# Patient Record
Sex: FEMALE | Race: WHITE
[De-identification: names, ages, dates, MRNs, and addresses within clinical notes are randomized per-mention and may not be internally consistent; named-entity substitution may affect disease eponyms.]

---

## 2018-08-22 ENCOUNTER — HOSPITAL ENCOUNTER (INPATIENT)
Dept: HOSPITAL 92 - L&D/OP | Age: 33
LOS: 7 days | Discharge: HOME | End: 2018-08-29
Attending: OBSTETRICS & GYNECOLOGY | Admitting: OBSTETRICS & GYNECOLOGY
Payer: COMMERCIAL

## 2018-08-22 VITALS — BODY MASS INDEX: 23.3 KG/M2

## 2018-08-22 DIAGNOSIS — Z88.1: ICD-10-CM

## 2018-08-22 DIAGNOSIS — Z79.899: ICD-10-CM

## 2018-08-22 DIAGNOSIS — E03.9: ICD-10-CM

## 2018-08-22 DIAGNOSIS — Z88.5: ICD-10-CM

## 2018-08-22 DIAGNOSIS — Z88.8: ICD-10-CM

## 2018-08-22 DIAGNOSIS — Z3A.35: ICD-10-CM

## 2018-08-22 LAB
ALBUMIN SERPL BCG-MCNC: 4.6 G/DL (ref 3.5–5)
ALP SERPL-CCNC: 61 U/L (ref 40–150)
ALT SERPL W P-5'-P-CCNC: 13 U/L (ref 8–55)
ANION GAP SERPL CALC-SCNC: 13 MMOL/L (ref 10–20)
AST SERPL-CCNC: 15 U/L (ref 5–34)
BILIRUB SERPL-MCNC: 0.5 MG/DL (ref 0.2–1.2)
BUN SERPL-MCNC: 15 MG/DL (ref 7–18.7)
CALCIUM SERPL-MCNC: 9.4 MG/DL (ref 7.8–10.44)
CHLORIDE SERPL-SCNC: 105 MMOL/L (ref 98–107)
CO2 SERPL-SCNC: 24 MMOL/L (ref 22–29)
CREAT CL PREDICTED SERPL C-G-VRATE: 108 ML/MIN (ref 70–130)
GLOBULIN SER CALC-MCNC: 2.5 G/DL (ref 2.4–3.5)
GLUCOSE SERPL-MCNC: 96 MG/DL (ref 70–105)
HBSAG INDEX: 0.21 S/CO (ref 0–0.99)
HGB BLD-MCNC: 11.9 G/DL (ref 12–16)
MCH RBC QN AUTO: 32.7 PG (ref 27–31)
MCV RBC AUTO: 89.8 FL (ref 78–98)
PLATELET # BLD AUTO: 168 THOU/UL (ref 130–400)
POTASSIUM SERPL-SCNC: 3.9 MMOL/L (ref 3.5–5.1)
RBC # BLD AUTO: 3.63 MILL/UL (ref 4.2–5.4)
SODIUM SERPL-SCNC: 138 MMOL/L (ref 136–145)
SYPHILIS ANTIBODY INDEX: 0.13 S/CO
WBC # BLD AUTO: 10.3 THOU/UL (ref 4.8–10.8)

## 2018-08-22 PROCEDURE — 82805 BLOOD GASES W/O2 SATURATION: CPT

## 2018-08-22 PROCEDURE — 85027 COMPLETE CBC AUTOMATED: CPT

## 2018-08-22 PROCEDURE — 86850 RBC ANTIBODY SCREEN: CPT

## 2018-08-22 PROCEDURE — 80053 COMPREHEN METABOLIC PANEL: CPT

## 2018-08-22 PROCEDURE — 86900 BLOOD TYPING SEROLOGIC ABO: CPT

## 2018-08-22 PROCEDURE — 82565 ASSAY OF CREATININE: CPT

## 2018-08-22 PROCEDURE — 86901 BLOOD TYPING SEROLOGIC RH(D): CPT

## 2018-08-22 PROCEDURE — 86780 TREPONEMA PALLIDUM: CPT

## 2018-08-22 PROCEDURE — A4216 STERILE WATER/SALINE, 10 ML: HCPCS

## 2018-08-22 PROCEDURE — 99285 EMERGENCY DEPT VISIT HI MDM: CPT

## 2018-08-22 PROCEDURE — 36415 COLL VENOUS BLD VENIPUNCTURE: CPT

## 2018-08-22 PROCEDURE — 87086 URINE CULTURE/COLONY COUNT: CPT

## 2018-08-22 PROCEDURE — 51702 INSERT TEMP BLADDER CATH: CPT

## 2018-08-22 PROCEDURE — 83735 ASSAY OF MAGNESIUM: CPT

## 2018-08-22 PROCEDURE — 84156 ASSAY OF PROTEIN URINE: CPT

## 2018-08-22 PROCEDURE — 87340 HEPATITIS B SURFACE AG IA: CPT

## 2018-08-22 PROCEDURE — 84155 ASSAY OF PROTEIN SERUM: CPT

## 2018-08-22 PROCEDURE — 85025 COMPLETE CBC W/AUTO DIFF WBC: CPT

## 2018-08-22 PROCEDURE — 82570 ASSAY OF URINE CREATININE: CPT

## 2018-08-22 PROCEDURE — 88307 TISSUE EXAM BY PATHOLOGIST: CPT

## 2018-08-22 RX ADMIN — Medication SCH MLS: at 20:04

## 2018-08-22 RX ADMIN — Medication SCH MLS: at 23:35

## 2018-08-22 RX ADMIN — Medication SCH MLS: at 14:45

## 2018-08-22 RX ADMIN — MAGNESIUM SULFATE HEPTAHYDRATE SCH MLS: 40 INJECTION, SOLUTION INTRAVENOUS at 14:45

## 2018-08-22 RX ADMIN — Medication SCH MLS: at 10:58

## 2018-08-22 NOTE — PDOC.LDHP
Labor and Delivery H&P


Chief complaint: loss of fluid


HPI: 





34yo  at 35w1d by LMP c/o ROM this am, clear fluid.  Denies sx PIH.  Occ 

abd pain, no intense ctx pains.  Good FM


Current gestational age (weeks): 35


Due date: 18


Dating criteria: last menstrual period


Grav: 1


Para: 0


Current pregnancy complications: none


Abnormal US findings: No


Past Medical History: 





CHTN prev on lisinopril, nl BP this pregnancy, h/o aplastic anemia s/p chemo 

and BMT, CHARLENE since > 10 yr ago.  hypothyroidism, on replacement, well 

controlled.


Current medications: pre-huy vitamins, other (levothyroxine)


Previous surgical history: other (bone marrow transplant, blood transfusions 

with aplastic anemia)


Allergies/Adverse Reactions: 


 Allergies











Allergy/AdvReac Type Severity Reaction Status Date / Time


 


amoxicillin [From Augmentin] Allergy Mild Hives Verified 18 21:37


 


clavulanic acid Allergy Mild itching Verified 18 21:37





[From Augmentin]     


 


promethazine [From Phenergan] Allergy Mild itching Verified 18 21:37


 


tramadol Allergy Mild itching Verified 18 21:37











Social history: none





- Physical Exam


Vital signs reviewed and normal: yes


General: NAD (nervous)


Heart: RRR


Lungs: CTAB


Abdomen: gravid


Extremeties: no edema


FHT: category 1


[place] contractions every: rare





- Vaginal Exam


cm dilated: 6


Effacement: 100%


Station: 0 (forebag palpable)





- OB Labs


RH: positive


Antibody Screen: negative


HIV: negative


RPR: negative


HEPSAg: negative


1 hour GCT: positive


3 hour GTT: normal


GBS: unknown


Urine drug screen: not done


Rubella: immune





- Assessment


L&D Assessment:  labor (/SROM, SIPIH)





- Plan


Plan: admit to L&D, GBS antibiotic prophylaxis, informed consent obtained, 

magnesium for seizure prophylaxis (labs wnl, urine pending.  s/p labetalol 20 

IV with mild range BP resulting, will cont prn labetalol.), anesthesia consult 

for pain management, other (BMZ 1/2, reinaldo consult per pt request)

## 2018-08-23 LAB
ANALYZER IN CARDIO: (no result)
BASE EXCESS STD BLDA CALC-SCNC: -4.8 MEQ/L
BASOPHILS # BLD AUTO: 0 THOU/UL (ref 0–0.2)
BASOPHILS NFR BLD AUTO: 0.1 % (ref 0–1)
EOSINOPHIL # BLD AUTO: 0.1 THOU/UL (ref 0–0.7)
EOSINOPHIL NFR BLD AUTO: 0.8 % (ref 0–10)
HCO3 BLDA-SCNC: 21.6 MEQ/L (ref 22–28)
HGB BLD-MCNC: 9.2 G/DL (ref 12–16)
LYMPHOCYTES # BLD: 1.4 THOU/UL (ref 1.2–3.4)
LYMPHOCYTES NFR BLD AUTO: 10.7 % (ref 21–51)
MCH RBC QN AUTO: 33.1 PG (ref 27–31)
MCV RBC AUTO: 90.7 FL (ref 78–98)
MONOCYTES # BLD AUTO: 1.2 THOU/UL (ref 0.11–0.59)
MONOCYTES NFR BLD AUTO: 9.5 % (ref 0–10)
NEUTROPHILS # BLD AUTO: 10 THOU/UL (ref 1.4–6.5)
NEUTROPHILS NFR BLD AUTO: 78.9 % (ref 42–75)
PLATELET # BLD AUTO: 132 THOU/UL (ref 130–400)
RBC # BLD AUTO: 2.77 MILL/UL (ref 4.2–5.4)
WBC # BLD AUTO: 12.6 THOU/UL (ref 4.8–10.8)

## 2018-08-23 PROCEDURE — 10D17Z9 MANUAL EXTRACTION OF PRODUCTS OF CONCEPTION, RETAINED, VIA NATURAL OR ARTIFICIAL OPENING: ICD-10-PCS | Performed by: OBSTETRICS & GYNECOLOGY

## 2018-08-23 RX ADMIN — Medication SCH MLS: at 04:46

## 2018-08-23 RX ADMIN — Medication PRN MLS: at 20:48

## 2018-08-23 RX ADMIN — BUPIVACAINE HYDROCHLORIDE SCH MLS: 5 INJECTION, SOLUTION EPIDURAL; INTRACAUDAL at 09:26

## 2018-08-23 RX ADMIN — MAGNESIUM SULFATE HEPTAHYDRATE SCH MLS: 40 INJECTION, SOLUTION INTRAVENOUS at 10:05

## 2018-08-23 RX ADMIN — Medication PRN MLS: at 21:54

## 2018-08-23 RX ADMIN — Medication SCH MLS: at 14:55

## 2018-08-23 RX ADMIN — Medication SCH MLS: at 10:00

## 2018-08-23 RX ADMIN — Medication SCH MLS: at 18:18

## 2018-08-23 RX ADMIN — BUPIVACAINE HYDROCHLORIDE SCH MLS: 5 INJECTION, SOLUTION EPIDURAL; INTRACAUDAL at 17:30

## 2018-08-23 RX ADMIN — MAGNESIUM SULFATE HEPTAHYDRATE SCH MLS: 40 INJECTION, SOLUTION INTRAVENOUS at 00:30

## 2018-08-23 NOTE — PDOC.OPDEL
OB Operative/Delivery Note


Delivery Dr/Surgeon: Kolby


Assist: n/a


Pre-Delivery Diagnosis: ruptured membrane (superimposed PIH at 35wk)


Procedure/Post Delivery Dx: spontaneous vaginal delivery (cord avulsion, PPH)


Weeks gestation: 35


Anesthesia: epidural





- Findings


  ** A


Sex: female


Weight: 4 lb 10 oz


Apgar - 1 min: 3


Apgar - 5 min: 7 (9 at 10 min)





- Additional Findings/Plan


Placenta delivered: manual removal (following cord avulsion, prolonged 3rd stage

, placenta would not separate intact, delivered in piecemeal fashion manually.  

Brisk bleeding following placenta, pitocin infusion and uterotonics called for, 

christopher catheter replaced, manual uterine exploration performed mutiple times 

until no remaining fragments of placenta were found.  Hemabate and cytotec were 

given.  Uterus contracted well and appropriate lochia was present.  EBL 2L, 

final qbl pending.)


Repaired Obstetrical Laceration: 1st degree (hemostatic and unrepaired.)


Estimated blood loss: 1741cc


Post delivery plan: recovery in LICU

## 2018-08-23 NOTE — PDOC.LDPN
Labor & Delivery Progress Note





- Subjective


Subjective: comfortable





- Objective


Vital signs reviewed and normal: yes


General: NAD


Uterine fundus: non tender


Dilation: 7


Effacement: 90%


Station: 0


FHT: category 1


[place] contractions every: 5min


Plan: pitocin for augmentation


-: 





(late entry from 1200 today) Pt very obtunded on exam, DTR decreased 1+ 

throughout, stop Mag and check stat Mag level, no sx PIH.  s/p epidural.  FHT 

reassuring.  Cont pitocin induction and GBS ppx.

## 2018-08-23 NOTE — PDOC.LDPN
Labor & Delivery Progress Note





- Subjective


Subjective: comfortable, loss of fluid





- Objective


Vital signs reviewed and normal: yes


General: NAD


Uterine fundus: non tender


Dilation: 6


Effacement: 100%


Station: 0


AROM: clear fluid (forebag aromed)


Plan: pitocin for augmentation, other (on PCN for GBS ppx, on Mag for sx ppx, 

no signs of PIH or mag toxicity, s/p labetalol this am for severe BP, labs wnl, 

PCR 1.5.  Proceed with pitocin for augmentation.)

## 2018-08-24 LAB
CREAT CL PREDICTED SERPL C-G-VRATE: 97 ML/MIN (ref 70–130)
HGB BLD-MCNC: 7.6 G/DL (ref 12–16)
MCH RBC QN AUTO: 33.2 PG (ref 27–31)
MCV RBC AUTO: 92 FL (ref 78–98)
PLATELET # BLD AUTO: 110 THOU/UL (ref 130–400)
RBC # BLD AUTO: 2.29 MILL/UL (ref 4.2–5.4)
WBC # BLD AUTO: 15.3 THOU/UL (ref 4.8–10.8)

## 2018-08-24 RX ADMIN — Medication SCH: at 00:41

## 2018-08-24 RX ADMIN — Medication SCH: at 00:28

## 2018-08-24 RX ADMIN — DOCUSATE CALCIUM SCH: 240 CAPSULE, LIQUID FILLED ORAL at 14:17

## 2018-08-24 NOTE — PDOC.PP
Post Partum Progress Note


Post Partum Day #: 1


PO intake tolerated: yes


Flatus: yes


Ambulation: no


 Vital Signs (12 hours)











  Temp Pulse Resp


 


 18 04:13  98.3 F  89  18


 


 18 00:00  98.3 F  89  18








 Weight











Weight                         128 lb

















- Physical Examination


General: NAD


Cardiovascular: RRR


Respiratory: non-labored breathing


Abdominal: no distention, appropriately TTP


Fundus firm & at: umb-2


Extremities: negative homans (B) (DTR 2+ throughout ext)


Skin: no rash


Neurological: no gross focal deficits


Psychiatric: normal affect


Result Diagrams: 


 18 05:38





 18 05:38


Additional Labs: 


 Post Partum Labs











Blood Type  A POSITIVE   18  06:24    


 


Hep Bs Antigen  Non-Reactive S/CO (NonReactive)   18  06:24    











(1)  premature rupture of membranes (PPROM) delivered, current 

hospitalization


Code(s): O42.919 - PRETRM RENAN ROM, UNSP TIME BETW RUPT AND ONST LABR, UNSP TRI

   Status: Acute   





(2) Severe preeclampsia


Code(s): O14.10 - SEVERE PRE-ECLAMPSIA, UNSPECIFIED TRIMESTER   Status: Acute   


Qualifiers: 


   Trimester: third trimester   Qualified Code(s): O14.13 - Severe pre-eclampsia

, third trimester   





- Assessment/Plan





PPD1 s/p PTSVD at 35w 2/2 SIPIH and PPROM c/b PPH


VSSAF


SIPIH- on mag x 24h no sx mag toxicity or PIH, diuresing well.  Follow mag 

levels due to supratherapetic level yesterday.  BP nl-mild, not requiring 

antihypertensives at this time.  Had some labetalol during labor.  


PPH ebl 1700, hgb 12-->7.6, no ongoing bleeding, no sx anemia.  Cont to monitor 

closely and start iron supp.


Cont christopher catheter, adv to reg diet, transfer to floor at 24h postpartum.

## 2018-08-25 LAB
HGB BLD-MCNC: 6.6 G/DL (ref 12–16)
HGB BLD-MCNC: 7.5 G/DL (ref 12–16)
MCH RBC QN AUTO: 33 PG (ref 27–31)
MCV RBC AUTO: 93.3 FL (ref 78–98)
PLATELET # BLD AUTO: 115 THOU/UL (ref 130–400)
RBC # BLD AUTO: 2.01 MILL/UL (ref 4.2–5.4)
WBC # BLD AUTO: 15.4 THOU/UL (ref 4.8–10.8)

## 2018-08-25 RX ADMIN — DOCUSATE CALCIUM SCH: 240 CAPSULE, LIQUID FILLED ORAL at 06:57

## 2018-08-25 RX ADMIN — DOCUSATE CALCIUM SCH MG: 240 CAPSULE, LIQUID FILLED ORAL at 14:19

## 2018-08-25 RX ADMIN — DOCUSATE CALCIUM SCH: 240 CAPSULE, LIQUID FILLED ORAL at 21:29

## 2018-08-25 NOTE — PDOC.PP
Post Partum Progress Note


Post Partum Day #: Doing well without complaints.


PO intake tolerated: yes


Flatus: yes


Ambulation: yes (minimal)


 Vital Signs (12 hours)











  Temp Pulse Resp BP Pulse Ox


 


 18 04:45  98.7 F  68  18  


 


 18 00:30  98.7 F  68  18  


 


 18 23:05  98.7 F  68  18  162/68 H  99








 Weight











Weight                         128 lb

















- Physical Examination


General: NAD


Respiratory: non-labored breathing


Abdominal: lochia (normal), no distention, appropriately TTP


Fundus firm & at: U-3


Extremities: negative homans (B)


Skin: no rash


Neurological: no gross focal deficits


Psychiatric: A&Ox3, normal affect


Result Diagrams: 


 18 05:48





 18 05:38


Additional Labs: 


 Post Partum Labs











Blood Type  A POSITIVE   18  06:24    


 


Hep Bs Antigen  Non-Reactive S/CO (NonReactive)   18  06:24    











(1)  premature rupture of membranes (PPROM) delivered, current 

hospitalization


Code(s): O42.919 - PRETRM RENAN ROM, UNSP TIME BETW RUPT AND ONST LABR, UNSP TRI

   Status: Acute   





(2) Severe preeclampsia


Code(s): O14.10 - SEVERE PRE-ECLAMPSIA, UNSPECIFIED TRIMESTER   Status: Acute   


Qualifiers: 


   Trimester: third trimester   Qualified Code(s): O14.13 - Severe pre-eclampsia

, third trimester   





- Assessment/Plan





Patient still having some anxiety but doing well.  Now s/p 24 hours of PP mag.  

Will continue to watch BPs today and anticipate d/c home tomorrow.

## 2018-08-26 RX ADMIN — DOCUSATE CALCIUM SCH: 240 CAPSULE, LIQUID FILLED ORAL at 08:14

## 2018-08-26 NOTE — PRG
DATE OF SERVICE:  2018.

 

SUBJECTIVE:  The patient is a 33-year-old female now postpartum day #3 status 
post a  spontaneous vaginal delivery for premature rupture of membranes 
with superimposed PIH.  The patient had approximately 2 liter of blood loss due 
to atony and retained placenta.  The patient's most recent hemoglobin was 7.5, 
hematocrit 21.5, which had been stable now for 24 hours.  The patient today or 
in the last 24 hours has been having occasional blood pressure spikes into the 
severe range with the most recent blood pressure being in the 180s over 90s.  
The patient is otherwise not having symptoms.  She is tolerating a diet, having 
decreased lochia and ambulating and voiding on her own.

 

PHYSICAL EXAMINATION:

GENERAL:  The patient appears to be in no acute distress.  She is alert and 
oriented, cooperative and pleasant to interact with.

HEENT:  Head is normocephalic, atraumatic.

ABDOMEN:  Fundus is firm.

EXTREMITIES:  Nontender with 1-2+ pitting edema bilaterally.

 

ASSESSMENT AND PLAN:  The patient is a 33-year-old female now postpartum day #3 
status post a  delivery for premature rupture of membranes and 
preeclampsia.  We have instituted the patient on oral antihypertensives, 
[id number] mg bid has been started.  In addition, the patient will be given 
10 mg of hydralazine IV now for her most current blood pressure.  Should she 
have good blood pressure control for the next 24 hours, the patient can be 
discharged tomorrow when her primary OB, Dr. Jarrett will be back on service.

 

Addendum:  blood pressure medicine decreased to labetolol 100mg bid as blood 
pressures in the 110s systolic
MTDD

## 2018-08-27 LAB
ANISOCYTOSIS BLD QL SMEAR: (no result) (100X)
HGB BLD-MCNC: 7.8 G/DL (ref 12–16)
MCH RBC QN AUTO: 33.8 PG (ref 27–31)
MCV RBC AUTO: 93.9 FL (ref 78–98)
MDIFF COMPLETE?: YES
PLATELET # BLD AUTO: 211 THOU/UL (ref 130–400)
PLATELET BLD QL SMEAR: (no result)
POLYCHROMASIA BLD QL SMEAR: (no result) (100X)
RBC # BLD AUTO: 2.31 MILL/UL (ref 4.2–5.4)
WBC # BLD AUTO: 17.5 THOU/UL (ref 4.8–10.8)

## 2018-08-27 RX ADMIN — DOCUSATE CALCIUM SCH: 240 CAPSULE, LIQUID FILLED ORAL at 00:13

## 2018-08-27 RX ADMIN — DOCUSATE CALCIUM SCH: 240 CAPSULE, LIQUID FILLED ORAL at 08:08

## 2018-08-27 RX ADMIN — DOCUSATE CALCIUM SCH: 240 CAPSULE, LIQUID FILLED ORAL at 23:41

## 2018-08-27 RX ADMIN — GENTAMICIN SULFATE SCH MLS: 40 INJECTION, SOLUTION INTRAMUSCULAR; INTRAVENOUS at 20:47

## 2018-08-27 RX ADMIN — HYDROCODONE BITARTRATE AND ACETAMINOPHEN PRN TAB: 5; 325 TABLET ORAL at 16:18

## 2018-08-27 NOTE — PDOC.PP
Post Partum Progress Note


Post Partum Day #: 5


Subjective: 





feels well no sx PIH


PO intake tolerated: yes


Flatus: yes


Ambulation: yes


 Vital Signs (12 hours)











  Temp Pulse Resp BP Pulse Ox


 


 18 04:00  98.2 F  76  18  133/78 


 


 18 23:28  98.5 F  95  18  


 


 18 21:59   82   


 


 18 20:00  98.5 F  95  18  157/86 H  97








 Weight











Weight                         128 lb

















- Physical Examination


General: NAD


Cardiovascular: RRR


Respiratory: non-labored breathing


Abdominal: no distention, appropriately TTP


Fundus firm & at: umb-2


Extremities: negative homans (B)


Skin: no rash


Neurological: no gross focal deficits


Psychiatric: normal affect


Result Diagrams: 


 18 19:07





 18 05:38


Additional Labs: 


 Post Partum Labs











Blood Type  A POSITIVE   18  06:24    


 


Hep Bs Antigen  Non-Reactive S/CO (NonReactive)   18  06:24    











(1)  premature rupture of membranes (PPROM) delivered, current 

hospitalization


Code(s): O42.919 - PRETRM RENAN ROM, UNSP TIME BETW RUPT AND ONST LABR, UNSP TRI

   Status: Acute   





(2) Severe preeclampsia


Code(s): O14.10 - SEVERE PRE-ECLAMPSIA, UNSPECIFIED TRIMESTER   Status: Acute   


Qualifiers: 


   Trimester: third trimester   Qualified Code(s): O14.13 - Severe pre-eclampsia

, third trimester   





(3) Postpartum hemorrhage


Code(s): O72.1 - OTHER IMMEDIATE POSTPARTUM HEMORRHAGE   Status: Acute   


Qualifiers: 


   Postpartum hemorrhage type: other immediate   Qualified Code(s): O72.1 - 

Other immediate postpartum hemorrhage   





- Assessment/Plan





PPD4 s/p PTSVD at 35w 2/2 PPROM and PIH c/b PPH 


VSSAF


PIH- s/p Mag, no sx PIH, started on labetalol yesterday for severe HTN, well 

controlled now, will cont on DC, BP log at home and FU 1 wk for BP check


PPH- 2/2 cord avulsion, manual extraction/fragmentation of placenta, hgb 12-->

ebl approx 1700cc-->hgb stable at 7.5, no s/sx anemia, no indication for 

transfusion at this time.


Breastpumping, infant in NICU doing well, no sx of PPD


Rh pos RImm


DC home FU 1 wk








Addendum: called by nursing at approx 1700, pt had incr cramping in pelvis and 

chills, temp was 101.2, BP severely elevated, will check CBC Ucx, start empiric 

amp/gent for endometritis, tylenol for fever and prn labetalol for elevated 

BPs.  Hold DC.

## 2018-08-28 RX ADMIN — DOCUSATE CALCIUM SCH: 240 CAPSULE, LIQUID FILLED ORAL at 09:13

## 2018-08-28 RX ADMIN — GENTAMICIN SULFATE SCH MLS: 40 INJECTION, SOLUTION INTRAMUSCULAR; INTRAVENOUS at 19:44

## 2018-08-28 RX ADMIN — HYDROCODONE BITARTRATE AND ACETAMINOPHEN PRN TAB: 5; 325 TABLET ORAL at 12:00

## 2018-08-28 RX ADMIN — HYDROCODONE BITARTRATE AND ACETAMINOPHEN PRN TAB: 5; 325 TABLET ORAL at 04:12

## 2018-08-28 RX ADMIN — DOCUSATE CALCIUM SCH: 240 CAPSULE, LIQUID FILLED ORAL at 20:47

## 2018-08-28 NOTE — PDOC.PP
Post Partum Progress Note


Post Partum Day #: 5


Subjective: 





incr abd crampiness since yesterday, not feeling well overall.  no heavy vag 

bleeding, light.  no foul odor.  


PO intake tolerated: yes


Flatus: yes


Ambulation: yes


 Vital Signs (12 hours)











  Temp Pulse Resp BP Pulse Ox


 


 18 04:25  99.4 F  80  16  134/96 H  97


 


 18 02:30  98.4 F    


 


 18 01:30  100.1 F H  84  15  130/76  98


 


 18 22:00  100.1 F H  84  15  130/76  98


 


 18 21:10   97   


 


 18 21:00  98.4 F  97  16  








 Weight











Weight                         128 lb

















- Physical Examination


General: NAD


Cardiovascular: RRR


Respiratory: non-labored breathing


Abdominal: + bowel sounds (+fundal tenderness), no distention


Fundus firm & at: umb-2


Extremities: negative homans (B)


Skin: no rash


Neurological: no gross focal deficits


Psychiatric: normal affect


Result Diagrams: 


 18 19:07





 18 05:38


Additional Labs: 


 Post Partum Labs











Blood Type  A POSITIVE   18  06:24    


 


Hep Bs Antigen  Non-Reactive S/CO (NonReactive)   18  06:24    











(1)  premature rupture of membranes (PPROM) delivered, current 

hospitalization


Code(s): O42.919 - PRETRM RENAN ROM, UNSP TIME BETW RUPT AND ONST LABR, UNSP TRI

   Status: Acute   





(2) Severe preeclampsia


Code(s): O14.10 - SEVERE PRE-ECLAMPSIA, UNSPECIFIED TRIMESTER   Status: Acute   


Qualifiers: 


   Trimester: third trimester   Qualified Code(s): O14.13 - Severe pre-eclampsia

, third trimester   





(3) Postpartum hemorrhage


Code(s): O72.1 - OTHER IMMEDIATE POSTPARTUM HEMORRHAGE   Status: Acute   


Qualifiers: 


   Postpartum hemorrhage type: other immediate   Qualified Code(s): O72.1 - 

Other immediate postpartum hemorrhage   





(4) Endometritis


Code(s): N71.9 - INFLAMMATORY DISEASE OF UTERUS, UNSPECIFIED   Status: Acute   





- Assessment/Plan





PPD5 s/p PTSVD 2/2 PPROM and PIH c/b PPH manual extraction of placenta and now 

endometritis


Tmax 101.2 yesterday about 4pm, started on amp/gent, still with tenderness from 

infection, will cont abx until at least 24h afebrile and pain improved.  WBC 17

, Ucx pending, if respikes will get blood cultures as well.  


PIH- on labetalol 100 bid, BP severe range incr med to 200 bid and cont to 

monitor, no sx PIH.  DC ibuprofen.


Cont postpartum care.

## 2018-08-29 VITALS — DIASTOLIC BLOOD PRESSURE: 83 MMHG | TEMPERATURE: 98 F | SYSTOLIC BLOOD PRESSURE: 131 MMHG

## 2018-08-29 RX ADMIN — HYDROCODONE BITARTRATE AND ACETAMINOPHEN PRN TAB: 5; 325 TABLET ORAL at 05:52

## 2018-08-29 RX ADMIN — DOCUSATE CALCIUM SCH: 240 CAPSULE, LIQUID FILLED ORAL at 08:08

## 2018-08-29 NOTE — PDOC.PP
Post Partum Progress Note


Post Partum Day #: 6


Subjective: 





pelvic pain and cramping is improving.  no heavy vag bleeding, fever or chills.

  No sx PIH.  


PO intake tolerated: yes


Flatus: yes


Ambulation: yes


 Vital Signs (12 hours)











  Temp Pulse Resp BP Pulse Ox


 


 18 08:52     127/67 


 


 18 08:09   83   


 


 18 08:07   83   


 


 18 08:05     171/87 H 


 


 18 08:00  98.1 F  83  20  182/106 H  98


 


 18 06:24   82   


 


 18 04:05  98.8 F  82  16  161/94 H  99


 


 18 23:32  99.1 F  88  16  113/70  95








 Weight











Weight                         128 lb

















- Physical Examination


General: NAD


Cardiovascular: RRR


Respiratory: non-labored breathing


Abdominal: no distention, appropriately TTP


Fundus firm & at: umb-2 nontender


Extremities: negative homans (B)


Neurological: no gross focal deficits


Psychiatric: normal affect


Result Diagrams: 


 18 19:07





 18 05:38


Additional Labs: 


 Post Partum Labs











Blood Type  A POSITIVE   18  06:24    


 


Hep Bs Antigen  Non-Reactive S/CO (NonReactive)   18  06:24    











(1)  premature rupture of membranes (PPROM) delivered, current 

hospitalization


Code(s): O42.919 - PRETRM RENAN ROM, UNSP TIME BETW RUPT AND ONST LABR, UNSP TRI

   Status: Acute   





(2) Severe preeclampsia


Code(s): O14.10 - SEVERE PRE-ECLAMPSIA, UNSPECIFIED TRIMESTER   Status: Acute   


Qualifiers: 


   Trimester: third trimester   Qualified Code(s): O14.13 - Severe pre-eclampsia

, third trimester   





(3) Postpartum hemorrhage


Code(s): O72.1 - OTHER IMMEDIATE POSTPARTUM HEMORRHAGE   Status: Acute   


Qualifiers: 


   Postpartum hemorrhage type: other immediate   Qualified Code(s): O72.1 - 

Other immediate postpartum hemorrhage   





(4) Endometritis


Code(s): N71.9 - INFLAMMATORY DISEASE OF UTERUS, UNSPECIFIED   Status: Acute   





- Assessment/Plan





PPD6 s/p PTSVD at 35w 2/2 PPROM, SIPIH c/b PPH manual extraction of placenta, 

severe HTN and endometritis.


Afebrile for 48h DC IV abx, fundal tenderness improved.   


SIPIH- BP has been very labile, responds well to labetalol 200 BID, will 

continue this on DC.  No Sx, s/p Mag


PPH hgb stable at 7.8 will cont Iron on DC.


DC to B&B as baby in NICU, FU in 1 wk with me.

## 2018-08-30 ENCOUNTER — HOSPITAL ENCOUNTER (EMERGENCY)
Dept: HOSPITAL 92 - ERS | Age: 33
Discharge: HOME | End: 2018-08-30
Payer: COMMERCIAL
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
## 2018-08-30 DIAGNOSIS — I10: ICD-10-CM

## 2018-08-30 DIAGNOSIS — E03.9: ICD-10-CM

## 2018-08-30 DIAGNOSIS — N93.9: ICD-10-CM

## 2018-08-30 DIAGNOSIS — Z04.3: Primary | ICD-10-CM

## 2018-08-30 DIAGNOSIS — Z79.899: ICD-10-CM

## 2018-08-30 PROCEDURE — 99283 EMERGENCY DEPT VISIT LOW MDM: CPT

## 2019-08-09 ENCOUNTER — HOSPITAL ENCOUNTER (INPATIENT)
Dept: HOSPITAL 92 - L&D/OP | Age: 34
LOS: 4 days | Discharge: ANOTHER HOSPITAL | DRG: 833 | End: 2019-08-13
Attending: OBSTETRICS & GYNECOLOGY | Admitting: OBSTETRICS & GYNECOLOGY
Payer: COMMERCIAL

## 2019-08-09 VITALS — BODY MASS INDEX: 23.2 KG/M2

## 2019-08-09 DIAGNOSIS — E03.9: ICD-10-CM

## 2019-08-09 DIAGNOSIS — O99.282: ICD-10-CM

## 2019-08-09 DIAGNOSIS — Z88.8: ICD-10-CM

## 2019-08-09 DIAGNOSIS — Z79.899: ICD-10-CM

## 2019-08-09 DIAGNOSIS — Z88.1: ICD-10-CM

## 2019-08-09 DIAGNOSIS — O11.2: Primary | ICD-10-CM

## 2019-08-09 DIAGNOSIS — O10.912: ICD-10-CM

## 2019-08-09 DIAGNOSIS — E78.5: ICD-10-CM

## 2019-08-09 DIAGNOSIS — Z3A.25: ICD-10-CM

## 2019-08-09 LAB
ALBUMIN SERPL BCG-MCNC: 3.1 G/DL (ref 3.5–5)
ALP SERPL-CCNC: 111 U/L (ref 40–150)
ALT SERPL W P-5'-P-CCNC: 18 U/L (ref 8–55)
ANION GAP SERPL CALC-SCNC: 14 MMOL/L (ref 10–20)
AST SERPL-CCNC: 28 U/L (ref 5–34)
BASOPHILS # BLD AUTO: 0.1 THOU/UL (ref 0–0.2)
BASOPHILS NFR BLD AUTO: 0.8 % (ref 0–1)
BILIRUB SERPL-MCNC: 0.3 MG/DL (ref 0.2–1.2)
BUN SERPL-MCNC: 19 MG/DL (ref 7–18.7)
CALCIUM SERPL-MCNC: 8.9 MG/DL (ref 7.8–10.44)
CHLORIDE SERPL-SCNC: 110 MMOL/L (ref 98–107)
CO2 SERPL-SCNC: 19 MMOL/L (ref 22–29)
CREAT CL PREDICTED SERPL C-G-VRATE: 101 ML/MIN (ref 70–130)
EOSINOPHIL # BLD AUTO: 0.1 THOU/UL (ref 0–0.7)
EOSINOPHIL NFR BLD AUTO: 0.7 % (ref 0–10)
GLOBULIN SER CALC-MCNC: 3 G/DL (ref 2.4–3.5)
GLUCOSE SERPL-MCNC: 105 MG/DL (ref 70–105)
HGB BLD-MCNC: 12.1 G/DL (ref 12–16)
LYMPHOCYTES # BLD: 3.1 THOU/UL (ref 1.2–3.4)
LYMPHOCYTES NFR BLD AUTO: 30.2 % (ref 21–51)
MCH RBC QN AUTO: 31.2 PG (ref 27–31)
MCV RBC AUTO: 88 FL (ref 78–98)
MONOCYTES # BLD AUTO: 1 THOU/UL (ref 0.11–0.59)
MONOCYTES NFR BLD AUTO: 9.6 % (ref 0–10)
NEUTROPHILS # BLD AUTO: 6 THOU/UL (ref 1.4–6.5)
NEUTROPHILS NFR BLD AUTO: 58.6 % (ref 42–75)
PLATELET # BLD AUTO: 145 THOU/UL (ref 130–400)
POTASSIUM SERPL-SCNC: 4.6 MMOL/L (ref 3.5–5.1)
RBC # BLD AUTO: 3.88 MILL/UL (ref 4.2–5.4)
SODIUM SERPL-SCNC: 138 MMOL/L (ref 136–145)
URATE SERPL-MCNC: 8 MG/DL (ref 2.6–6)
WBC # BLD AUTO: 10.2 THOU/UL (ref 4.8–10.8)

## 2019-08-09 PROCEDURE — 84156 ASSAY OF PROTEIN URINE: CPT

## 2019-08-09 PROCEDURE — 80053 COMPREHEN METABOLIC PANEL: CPT

## 2019-08-09 PROCEDURE — 82570 ASSAY OF URINE CREATININE: CPT

## 2019-08-09 PROCEDURE — 87081 CULTURE SCREEN ONLY: CPT

## 2019-08-09 PROCEDURE — 36415 COLL VENOUS BLD VENIPUNCTURE: CPT

## 2019-08-09 PROCEDURE — 51702 INSERT TEMP BLADDER CATH: CPT

## 2019-08-09 PROCEDURE — 86900 BLOOD TYPING SEROLOGIC ABO: CPT

## 2019-08-09 PROCEDURE — 76815 OB US LIMITED FETUS(S): CPT

## 2019-08-09 PROCEDURE — 59025 FETAL NON-STRESS TEST: CPT

## 2019-08-09 PROCEDURE — 84450 TRANSFERASE (AST) (SGOT): CPT

## 2019-08-09 PROCEDURE — 84550 ASSAY OF BLOOD/URIC ACID: CPT

## 2019-08-09 PROCEDURE — 86901 BLOOD TYPING SEROLOGIC RH(D): CPT

## 2019-08-09 PROCEDURE — 76819 FETAL BIOPHYS PROFIL W/O NST: CPT

## 2019-08-09 PROCEDURE — 99285 EMERGENCY DEPT VISIT HI MDM: CPT

## 2019-08-09 PROCEDURE — 84443 ASSAY THYROID STIM HORMONE: CPT

## 2019-08-09 PROCEDURE — 80048 BASIC METABOLIC PNL TOTAL CA: CPT

## 2019-08-09 PROCEDURE — 85025 COMPLETE CBC W/AUTO DIFF WBC: CPT

## 2019-08-09 PROCEDURE — 86850 RBC ANTIBODY SCREEN: CPT

## 2019-08-09 PROCEDURE — 83735 ASSAY OF MAGNESIUM: CPT

## 2019-08-09 RX ADMIN — MAGNESIUM SULFATE HEPTAHYDRATE SCH MLS: 40 INJECTION, SOLUTION INTRAVENOUS at 17:40

## 2019-08-09 RX ADMIN — NIFEDIPINE SCH MG: 60 TABLET, EXTENDED RELEASE ORAL at 21:01

## 2019-08-09 NOTE — ULT
US OB Ltd



History: Evaluate fetal weight presentation and placental location



Comparison: Ultrasound OB August 1, 2018



Findings: Real-time grayscale color and spectral analysis of the gravid uterus was performed.



Single viable intrauterine pregnancy with heart rate documented at 145 bpm. Amniotic fluid index rhea
ures 10.4 cm. Cervix is closed measuring 3 cm.



The position is vertex and the placenta is posterior.



Average ultrasound age: 24 week 2 day with estimated date of delivery November 27, 2019.



Estimated fetal weight is 1 lb. 8 oz., 10th percentile.



Impression: Single viable intrauterine pregnancy as above.



Reported By: Shashi Zayas 

Electronically Signed:  8/9/2019 6:10 PM

## 2019-08-10 LAB
ALBUMIN SERPL BCG-MCNC: 2.6 G/DL (ref 3.5–5)
ALBUMIN SERPL BCG-MCNC: 2.7 G/DL (ref 3.5–5)
ALP SERPL-CCNC: 100 U/L (ref 40–150)
ALP SERPL-CCNC: 94 U/L (ref 40–150)
ALT SERPL W P-5'-P-CCNC: 16 U/L (ref 8–55)
ALT SERPL W P-5'-P-CCNC: 17 U/L (ref 8–55)
ANION GAP SERPL CALC-SCNC: 17 MMOL/L (ref 10–20)
ANION GAP SERPL CALC-SCNC: 17 MMOL/L (ref 10–20)
AST SERPL-CCNC: 25 U/L (ref 5–34)
AST SERPL-CCNC: 27 U/L (ref 5–34)
BASOPHILS # BLD AUTO: 0 THOU/UL (ref 0–0.2)
BASOPHILS NFR BLD AUTO: 0 % (ref 0–1)
BASOPHILS NFR BLD AUTO: 0.3 % (ref 0–1)
BASOPHILS NFR BLD AUTO: 0.3 % (ref 0–1)
BILIRUB SERPL-MCNC: 0.2 MG/DL (ref 0.2–1.2)
BILIRUB SERPL-MCNC: 0.2 MG/DL (ref 0.2–1.2)
BUN SERPL-MCNC: 20 MG/DL (ref 7–18.7)
BUN SERPL-MCNC: 21 MG/DL (ref 7–18.7)
CALCIUM SERPL-MCNC: 7.8 MG/DL (ref 7.8–10.44)
CALCIUM SERPL-MCNC: 8.4 MG/DL (ref 7.8–10.44)
CHLORIDE SERPL-SCNC: 102 MMOL/L (ref 98–107)
CHLORIDE SERPL-SCNC: 103 MMOL/L (ref 98–107)
CO2 SERPL-SCNC: 15 MMOL/L (ref 22–29)
CO2 SERPL-SCNC: 16 MMOL/L (ref 22–29)
CREAT CL PREDICTED SERPL C-G-VRATE: 93 ML/MIN (ref 70–130)
CREAT CL PREDICTED SERPL C-G-VRATE: 94 ML/MIN (ref 70–130)
EOSINOPHIL # BLD AUTO: 0 THOU/UL (ref 0–0.7)
EOSINOPHIL # BLD AUTO: 0 THOU/UL (ref 0–0.7)
EOSINOPHIL # BLD AUTO: 0.1 THOU/UL (ref 0–0.7)
EOSINOPHIL NFR BLD AUTO: 0.1 % (ref 0–10)
EOSINOPHIL NFR BLD AUTO: 0.2 % (ref 0–10)
EOSINOPHIL NFR BLD AUTO: 0.7 % (ref 0–10)
GLOBULIN SER CALC-MCNC: 3.2 G/DL (ref 2.4–3.5)
GLOBULIN SER CALC-MCNC: 3.3 G/DL (ref 2.4–3.5)
GLUCOSE SERPL-MCNC: 127 MG/DL (ref 70–105)
GLUCOSE SERPL-MCNC: 144 MG/DL (ref 70–105)
HGB BLD-MCNC: 10.9 G/DL (ref 12–16)
HGB BLD-MCNC: 11.1 G/DL (ref 12–16)
HGB BLD-MCNC: 11.4 G/DL (ref 12–16)
LYMPHOCYTES # BLD: 1.3 THOU/UL (ref 1.2–3.4)
LYMPHOCYTES # BLD: 1.8 THOU/UL (ref 1.2–3.4)
LYMPHOCYTES # BLD: 2.1 THOU/UL (ref 1.2–3.4)
LYMPHOCYTES NFR BLD AUTO: 16.5 % (ref 21–51)
LYMPHOCYTES NFR BLD AUTO: 19.8 % (ref 21–51)
LYMPHOCYTES NFR BLD AUTO: 23.2 % (ref 21–51)
MAGNESIUM SERPL-MCNC: 8.6 MG/DL (ref 1.6–2.6)
MCH RBC QN AUTO: 30 PG (ref 27–31)
MCH RBC QN AUTO: 30.4 PG (ref 27–31)
MCH RBC QN AUTO: 30.9 PG (ref 27–31)
MCV RBC AUTO: 87.8 FL (ref 78–98)
MCV RBC AUTO: 88 FL (ref 78–98)
MCV RBC AUTO: 88.8 FL (ref 78–98)
MONOCYTES # BLD AUTO: 0.2 THOU/UL (ref 0.11–0.59)
MONOCYTES # BLD AUTO: 0.4 THOU/UL (ref 0.11–0.59)
MONOCYTES # BLD AUTO: 0.8 THOU/UL (ref 0.11–0.59)
MONOCYTES NFR BLD AUTO: 1.7 % (ref 0–10)
MONOCYTES NFR BLD AUTO: 5 % (ref 0–10)
MONOCYTES NFR BLD AUTO: 9.2 % (ref 0–10)
NEUTROPHILS # BLD AUTO: 6.1 THOU/UL (ref 1.4–6.5)
NEUTROPHILS # BLD AUTO: 6.1 THOU/UL (ref 1.4–6.5)
NEUTROPHILS # BLD AUTO: 7.3 THOU/UL (ref 1.4–6.5)
NEUTROPHILS NFR BLD AUTO: 67.1 % (ref 42–75)
NEUTROPHILS NFR BLD AUTO: 77.5 % (ref 42–75)
NEUTROPHILS NFR BLD AUTO: 78.4 % (ref 42–75)
PLATELET # BLD AUTO: 110 THOU/UL (ref 130–400)
PLATELET # BLD AUTO: 116 THOU/UL (ref 130–400)
PLATELET # BLD AUTO: 124 THOU/UL (ref 130–400)
POTASSIUM SERPL-SCNC: 4.6 MMOL/L (ref 3.5–5.1)
POTASSIUM SERPL-SCNC: 4.7 MMOL/L (ref 3.5–5.1)
RBC # BLD AUTO: 3.58 MILL/UL (ref 4.2–5.4)
RBC # BLD AUTO: 3.59 MILL/UL (ref 4.2–5.4)
RBC # BLD AUTO: 3.79 MILL/UL (ref 4.2–5.4)
SODIUM SERPL-SCNC: 129 MMOL/L (ref 136–145)
SODIUM SERPL-SCNC: 131 MMOL/L (ref 136–145)
WBC # BLD AUTO: 7.9 THOU/UL (ref 4.8–10.8)
WBC # BLD AUTO: 9.1 THOU/UL (ref 4.8–10.8)
WBC # BLD AUTO: 9.3 THOU/UL (ref 4.8–10.8)

## 2019-08-10 RX ADMIN — ONDANSETRON PRN MG: 2 INJECTION INTRAMUSCULAR; INTRAVENOUS at 07:48

## 2019-08-10 RX ADMIN — MAGNESIUM SULFATE HEPTAHYDRATE SCH MLS: 40 INJECTION, SOLUTION INTRAVENOUS at 01:36

## 2019-08-10 RX ADMIN — NIFEDIPINE SCH MG: 60 TABLET, EXTENDED RELEASE ORAL at 21:05

## 2019-08-10 RX ADMIN — LACTOBACILLUS ACIDOPH-L.BULGARICUS 1 MILLION CELL CHEWABLE TABLET SCH: at 09:32

## 2019-08-10 RX ADMIN — NIFEDIPINE SCH MG: 60 TABLET, EXTENDED RELEASE ORAL at 09:29

## 2019-08-10 RX ADMIN — MAGNESIUM SULFATE HEPTAHYDRATE SCH MLS: 40 INJECTION, SOLUTION INTRAVENOUS at 14:23

## 2019-08-10 NOTE — PRG
DATE OF SERVICE:  08/10/2019



SUBJECTIVE:  The patient is a 34-year-old, G2, P1 female with an intrauterine

pregnancy at 25 weeks and 2 days, complicated by chronic hypertension and

superimposed preeclampsia.  The patient was admitted yesterday for severe elevations

in her blood pressure that required multiple IV medications.  She is on magnesium

for seizure prophylaxis, has been getting steroids for fetal lung maturity.  The

patient is currently on Procardia XL 60 mg p.o. b.i.d. and labetalol 200 mg t.i.d.

with IV labetalol p.r.n. for elevated blood pressures.  Labs yesterday were

significant for a urine to creatinine ratio of 9 and platelets of 145,000.  Repeat

labs this morning show platelets falling to 115,000 this morning.  Blood pressures

have remained in the normal to mild range.  The patient this morning has no

complaints.  Denies any headache, shortness of breath, or abdominal pain. 



OBJECTIVE:  VITAL SIGNS:  Current blood pressure is 133/80, heart rate of 75,

respiratory rate 18, temperature 98. 

GENERAL:  She is resting comfortably in bed without any signs of distress. 

ABDOMEN:  Soft. 

EXTREMITIES:  Nontender.  DTRs are 2+ with clonus, which has shown some more

exaggeration as compared to yesterday. 



ASSESSMENT AND PLAN:  The patient is hospital day 2, on magnesium for chronic

hypertension, superimposed preeclampsia.  Platelets are dropping.  Will need repeat

labs later this afternoon for evaluation.   intensive care unit is aware of

her presence and is okay with delivery should that be necessity.  The oncoming

physician, Dr. Perea will be caring for her until Dr. Jarrett returns on Monday. 







Job ID:  [id number]

## 2019-08-10 NOTE — HP
PRIMARY OB:  Brook Jarrett MD.



CHIEF COMPLAINT:  Elevated blood pressures.



HISTORY OF PRESENT ILLNESS:  The patient is a 34-year-old G2, P1 female, with an

intrauterine pregnancy at 25 weeks and 1 day, complicated by chronic hypertension

and cervical insufficiency, status post cerclage.  The patient was seen today at a

routine visit with her maternal fetal medicine physician.  There was noted to have

blood pressures in the severe range and was brought here for evaluation.  The

patient denies any headache, shortness of breath, abdominal pains, labor pains.  She

does report that she takes 60 mg of Procardia twice a day.  She also reports that

she just started today up from 60 mg at night and 30 during the day.  She also

reports that she is on labetalol 200 mg that she takes 2 to 3 times a day as she

needs to.  When inquired about this p.r.n. doses, she reports that she postpones her

medication if she notices her blood pressures are well within the normal range.  The

patient reports that she was recently hospitalized and often for blood pressure

exacerbation and there was noted to have a 24-hour urine protein of 312 mg.  The

patient self reports anxiety and believes a lot of her blood pressure now may be

partially caused by the level of anxiety she is feeling. 



The patient denies any recent illness with fever and cough, headache, chest pain,

shortness of breath, nausea, vomiting, diarrhea, constipation, hip problems, knee

problems, muscle weakness, any new rashes.  She denies vaginal bleeding, change in

discharge.  She denies urinary urgency or frequency.  She denies uterine

contractions. 



PAST MEDICAL HISTORY:  

1. Chronic hypertension.

2. Hyperlipidemia.

3. Aplastic anemia requiring a bone marrow transplant and stem cell transplant with

whole-body irradiation. 

4. Premature ovarian failure.



PAST SURGICAL HISTORY:  Cervical biopsies and cervical cerclage.



ALLERGIES:  AMBIEN, AUGMENTIN, PHENERGAN, AND TRAMADOL.



MEDICATIONS:  

1. Hydroxyprogesterone caproate 250 mg/mL IM weekly.

2. Levothyroxine 88 mcg daily.

3. Procardia XL 60 mg twice a day.

4. Labetalol 200 mg twice a day.



SOCIAL HISTORY:  Denies drug, alcohol, tobacco use.



OB LABS:  She is rubella immune.  Baseline protein to creatinine ratio was 0.28.

Hepatitis B surface antigen nonreactive.  Blood type is A positive.  Antibody

screen, HIV is negative.  Most recent TSH is 1.2 on April 2019. 



REVIEW OF SYSTEMS:  Per HPI.



PHYSICAL EXAMINATION:

VITAL SIGNS:  Blood pressure on presentation 163/98, heart rate of 66, respiratory

rate of 16, saturating 100% on room air, temperature 98.5, blood pressure is

persisted in the severe range in the 160s/90s to 160/100s up to 175/104.  The

patient was given 5 mg of hydralazine followed by 20 mg of IV labetalol.  The

patient later required a second dose of IV labetalol.  She was started on her oral

regimen which included labetalol 200 mg 3 times a day and was given her 200

afternoon dose p.o.  Since then, her blood pressures have remained in the mild

range, primarily in the 150s/90s with her most recent blood pressure being 139/88,

heart rate of 62, saturating 99% on room air. 

GENERAL:  She appears to be in no acute distress.  She is alert, oriented,

cooperative, and pleasant to interact with. 

HEAD:  Normocephalic and atraumatic. 

LUNGS:  Clear to auscultation bilaterally. 

HEART:  Regular rate and rhythm. 

ABDOMEN:  Soft, gravid, nontender.  There is no right upper quadrant tenderness to

palpation. 

EXTREMITIES:  Nontender.  Nonedematous.  DTRs are 1+ to 2+.  She does have 1 to 2

beats of clonus. 



DIAGNOSTIC DATA:  Fetal heart tracing shows fetus with a baseline in the 140s with

moderate long-term variability appropriate for a 25-week gestation.  Tocometer is

not showing any contractions. 



LABORATORY DATA:  White count 10.2, hemoglobin 12.1, hematocrit 34.2, platelets

145,000.  Sodium 138, potassium 4.6, chloride 110, bicarb 19, BUN 19, creatinine

0.73, glucose 105, uric acid 8.0, calcium 8.9, AST 28, ALT 18.  Total random urine;

protein is 656 and creatinine 72.4.  Protein to creatinine ratio being 9.  Blood

type is A positive with a negative antibody screen.  Fetal ultrasound shows a fetus

in vertex presentation measuring 1 pound 8 ounces at the 10th percentile.  Placenta

is posterior and cervix is closed measuring 3 cm. 



ASSESSMENT AND PLAN:  The patient is a 34-year-old female, with chronic hypertension

and superimposed preeclampsia as evidenced by severe range pressures plus

significant increase in proteinuria.  I do not know her baseline platelet status as

platelets maybe running lower than her baseline.  We will work to identify what that

is at the outside facility.  However, platelets here in August of last year were

211.  The patient has been placed on magnesium for seizure prophylaxis.  She has

been given her baseline medications and continued the increase in her Procardia and

I have increased her labetalol to t.i.d. scheduled.  We will continue her

levothyroxine 88 mcg daily.  The patient has also been started on steroids for fetal

lung maturity.  Magnesium will also serve for neuro protection.  Neonatology has

been notified of the patient's presence and at risk for early delivery.  They are

prepared and have said that the baby can be appropriately cared for here if

delivered in the near future.  If Dr. Jarrett, her primary OB, will be assuming care

after the weekend, and we will be keeping a close eye on her with serial labs over

the near future and treat her blood pressures p.r.n. as necessary with IV labetalol

as she seems to respond well to it.  Fetus is appropriate for gestational age with a

category I tracing.  The patient is on SCDs for DVT prophylaxis. 







Job ID:  [id number]

## 2019-08-10 NOTE — PRG
DATE OF SERVICE:  08/10/2019



TIME OF SERVICE:  20:30.



SUBJECTIVE:  Ms. Beltre is 25 weeks gestation and 2 days with chronic hypertension

with superimposed preeclampsia.  She is noted to have proteinuria.  She was admitted

back on the  and has been getting betamethasone for fetal lung maturity,

magnesium sulfate for seizure prophylaxis as well as neuroprotection.  She was

restarted on labetalol 200 mg p.o. b.i.d.  She denies headache or scotoma. 



OBJECTIVE:  VITAL SIGNS:  The patient's vital signs today have been with a pulse in

the 60s to 70s.  Blood pressures have ranged from on 120s/70s to 186/110, these were

very labile intermediate results and were after the patient has been approximately

12 hours of not receiving her p.o. labetalol.  She was restarted on her p.o.

labetalol.  She did have an elevated blood pressure this evening at 167/100 and

decision was made to add hydralazine IV for treatment since she is already receiving

labetalol p.o.  Urine output has been greater than 100 mL/hour.  Fetal status has

been reassuring.  DTRs are 2+.  Magnesium level earlier in the day was elevated at

10.4, it was stopped for an hour and restarted at 1 g/hour, last value was at 19:16

of 7.9.  Platelet count upon admission was 145, it decreased this morning to 116,

however, redo this afternoon was 124.  Last hematocrit was 31.6%. 



IMPRESSION:  Severe preeclampsia, superimposed on chronic hypertension at 25 weeks

and 2 days. 



PLAN:  Because of the patient's early gestational age, prolongation of the pregnancy

is a desired process at this time.  We will continue the magnesium and 

corticosteroids as well as labetalol and hydralazine.  Preference would be to reach

approximately 48 hours of therapy, which would be the p.m. of .  We will

continue current therapy and recheck all laboratory values a.m. of .

Face-to-face encounter time at the bedside during this afternoon is approximately 20

minutes. 







Job ID:  [id number]

## 2019-08-11 LAB
ANION GAP SERPL CALC-SCNC: 14 MMOL/L (ref 10–20)
AST SERPL-CCNC: 22 U/L (ref 5–34)
BASOPHILS # BLD AUTO: 0 THOU/UL (ref 0–0.2)
BASOPHILS NFR BLD AUTO: 0.2 % (ref 0–1)
BUN SERPL-MCNC: 26 MG/DL (ref 7–18.7)
CALCIUM SERPL-MCNC: 7.2 MG/DL (ref 7.8–10.44)
CHLORIDE SERPL-SCNC: 103 MMOL/L (ref 98–107)
CO2 SERPL-SCNC: 19 MMOL/L (ref 22–29)
CREAT CL PREDICTED SERPL C-G-VRATE: 92 ML/MIN (ref 70–130)
EOSINOPHIL # BLD AUTO: 0.1 THOU/UL (ref 0–0.7)
EOSINOPHIL NFR BLD AUTO: 0.8 % (ref 0–10)
GLUCOSE SERPL-MCNC: 148 MG/DL (ref 70–105)
HGB BLD-MCNC: 11.3 G/DL (ref 12–16)
LYMPHOCYTES # BLD: 1.6 THOU/UL (ref 1.2–3.4)
LYMPHOCYTES NFR BLD AUTO: 21.4 % (ref 21–51)
MAGNESIUM SERPL-MCNC: 8.3 MG/DL (ref 1.6–2.6)
MCH RBC QN AUTO: 30.8 PG (ref 27–31)
MCV RBC AUTO: 88.3 FL (ref 78–98)
MONOCYTES # BLD AUTO: 0.3 THOU/UL (ref 0.11–0.59)
MONOCYTES NFR BLD AUTO: 4 % (ref 0–10)
NEUTROPHILS # BLD AUTO: 5.6 THOU/UL (ref 1.4–6.5)
NEUTROPHILS NFR BLD AUTO: 73.6 % (ref 42–75)
PLATELET # BLD AUTO: 118 THOU/UL (ref 130–400)
POTASSIUM SERPL-SCNC: 4.7 MMOL/L (ref 3.5–5.1)
RBC # BLD AUTO: 3.67 MILL/UL (ref 4.2–5.4)
SODIUM SERPL-SCNC: 131 MMOL/L (ref 136–145)
WBC # BLD AUTO: 7.6 THOU/UL (ref 4.8–10.8)

## 2019-08-11 RX ADMIN — NIFEDIPINE SCH MG: 60 TABLET, EXTENDED RELEASE ORAL at 09:11

## 2019-08-11 RX ADMIN — LACTOBACILLUS ACIDOPH-L.BULGARICUS 1 MILLION CELL CHEWABLE TABLET SCH: at 09:15

## 2019-08-11 RX ADMIN — NIFEDIPINE SCH MG: 60 TABLET, EXTENDED RELEASE ORAL at 22:14

## 2019-08-11 RX ADMIN — MAGNESIUM SULFATE HEPTAHYDRATE SCH MLS: 40 INJECTION, SOLUTION INTRAVENOUS at 10:01

## 2019-08-11 NOTE — PDOC.LDPN
Labor & Delivery Progress Note





- Objective


Vital signs reviewed and normal: yes (120/76 on dual medication)


General: NAD


Uterine fundus: non tender





- Assessment


(1) Hypertension


Code(s): I10 - ESSENTIAL (PRIMARY) HYPERTENSION   Current Visit: No   Status: 

Acute   


Plan: continue plan of care (Mag Sulfate will be stopped aroung 1800; continue 

dual BP meds (labetolol and procardia); steroids given...cerclage remains.), 

other (At Bedside now, plan discussed with her and partner. Doing well so far. 

SCDs when in bed. Once mag over, no longer bed rest)

## 2019-08-11 NOTE — PDOC.EVN
Event Note





- Event Note


Event Note: 





On Call OBDELPHINEN





Report in process





1700...will be off Mag for steroid benefit.





Baby is vertex





25 weeks...TIFFANY aware and ok with delivery here





Severe Preeclampsia among CHTN





On Mag and has cerclage...on scheduled labetolol and procardia with prn 

hydralazine

## 2019-08-11 NOTE — PRG
DATE OF SERVICE:  08/11/2019



TIME OF SERVICE:  0736 hours.



SUBJECTIVE:  The patient is a 25 weeks 3 days with chronic hypertension,

superimposed preeclampsia with severe range proteinuria.  She was admitted on the

9th for magnesium sulfate prophylaxis and betamethasone for fetal lung maturity.

She remains on labetalol 200 p.o. b.i.d. and Procardia 60 b.i.d.  The patient is

resting comfortably.  Denies headache.  Reports active fetus. 



OBJECTIVE:  Blood pressures overnight have been in 130s to 140s systolic over 70s to

80s diastolic.  Urine outputs being greater than 100 mL/hour.  DTRs were 2+. 



LABORATORY:  Repeat labs this morning were performed.  Platelet count is up slightly

from 110 to 118.  Hematocrit is stable at 32%.  AST remains within normal limits.

Magnesium level is at 8.3, slightly higher than 7.9 yesterday p.m., but lower than

previous high of 10.4. 



IMPRESSION:  Second trimester pregnancy with chronic hypertension, superimposed

preeclampsia, history of aplastic anemia, status post bone marrow transplant,

cervical cerclage. 



PLAN:  Continue magnesium sulfate at least through this afternoon.  We will continue

betamethasone and antihypertensives.  If the patient's blood pressure is stabilized

on oral medications, may consider discontinuation from Labor and Delivery Unit and

possibly discharge home with outpatient followup.  Dr. Phil Nelson, OB hospitalist,

taking over at 0800 hours. 







Job ID:  [id number]

## 2019-08-12 LAB
ALBUMIN SERPL BCG-MCNC: 2.9 G/DL (ref 3.5–5)
ALP SERPL-CCNC: 97 U/L (ref 40–150)
ALT SERPL W P-5'-P-CCNC: 31 U/L (ref 8–55)
ANION GAP SERPL CALC-SCNC: 11 MMOL/L (ref 10–20)
AST SERPL-CCNC: 34 U/L (ref 5–34)
BASOPHILS # BLD AUTO: 0.1 THOU/UL (ref 0–0.2)
BASOPHILS NFR BLD AUTO: 0.6 % (ref 0–1)
BILIRUB SERPL-MCNC: 0.2 MG/DL (ref 0.2–1.2)
BUN SERPL-MCNC: 34 MG/DL (ref 7–18.7)
CALCIUM SERPL-MCNC: 7.1 MG/DL (ref 7.8–10.44)
CHLORIDE SERPL-SCNC: 106 MMOL/L (ref 98–107)
CO2 SERPL-SCNC: 21 MMOL/L (ref 22–29)
CREAT CL PREDICTED SERPL C-G-VRATE: 84 ML/MIN (ref 70–130)
EOSINOPHIL # BLD AUTO: 0.1 THOU/UL (ref 0–0.7)
EOSINOPHIL NFR BLD AUTO: 1.1 % (ref 0–10)
GLOBULIN SER CALC-MCNC: 3.1 G/DL (ref 2.4–3.5)
GLUCOSE SERPL-MCNC: 111 MG/DL (ref 70–105)
HGB BLD-MCNC: 11.2 G/DL (ref 12–16)
LYMPHOCYTES # BLD: 2.7 THOU/UL (ref 1.2–3.4)
LYMPHOCYTES NFR BLD AUTO: 29.4 % (ref 21–51)
MCH RBC QN AUTO: 29.4 PG (ref 27–31)
MCV RBC AUTO: 87.2 FL (ref 78–98)
MONOCYTES # BLD AUTO: 1 THOU/UL (ref 0.11–0.59)
MONOCYTES NFR BLD AUTO: 10.7 % (ref 0–10)
NEUTROPHILS # BLD AUTO: 5.3 THOU/UL (ref 1.4–6.5)
NEUTROPHILS NFR BLD AUTO: 58.2 % (ref 42–75)
PLATELET # BLD AUTO: 117 THOU/UL (ref 130–400)
POTASSIUM SERPL-SCNC: 4.1 MMOL/L (ref 3.5–5.1)
RBC # BLD AUTO: 3.79 MILL/UL (ref 4.2–5.4)
SODIUM SERPL-SCNC: 134 MMOL/L (ref 136–145)
WBC # BLD AUTO: 9 THOU/UL (ref 4.8–10.8)

## 2019-08-12 RX ADMIN — LACTOBACILLUS ACIDOPH-L.BULGARICUS 1 MILLION CELL CHEWABLE TABLET SCH TAB: at 08:52

## 2019-08-12 RX ADMIN — NIFEDIPINE SCH MG: 60 TABLET, EXTENDED RELEASE ORAL at 20:43

## 2019-08-12 RX ADMIN — NIFEDIPINE SCH MG: 60 TABLET, EXTENDED RELEASE ORAL at 08:53

## 2019-08-12 RX ADMIN — LACTOBACILLUS ACIDOPH-L.BULGARICUS 1 MILLION CELL CHEWABLE TABLET SCH: at 08:56

## 2019-08-12 NOTE — PDOC.LDPN
Labor & Delivery Progress Note





- Subjective


Subjective: no concerns (was feeling weak and dizzy with HA yesterday after 

coming off mag, now feeling better.  no HA vision change RUQ pain, +FM, no ctx.)





- Objective


Abnormal vital signs: nl-mild range BP, severe range x 1 last pm at 1700


General: NAD


Uterine fundus: non tender


FHT: category 1


[place] contractions every: none


-: 





Repeat labs today, platelets have been stable over last 2 days, mildly decreased

, LFT wnl, BUN/Creat has been gradually increasing, cont to monitor with daily 

checks.  Cont HTN meds procardia and labetalol.  Q shift monitoring.  Will 

repeat sono for BPP this week.  s/p BMZ and Mag.  For inpt mgmt of severe 

preeclampsia.

## 2019-08-13 VITALS — SYSTOLIC BLOOD PRESSURE: 150 MMHG | DIASTOLIC BLOOD PRESSURE: 85 MMHG

## 2019-08-13 LAB
ALBUMIN SERPL BCG-MCNC: 2.5 G/DL (ref 3.5–5)
ALBUMIN SERPL BCG-MCNC: 2.5 G/DL (ref 3.5–5)
ALP SERPL-CCNC: 80 U/L (ref 40–150)
ALP SERPL-CCNC: 86 U/L (ref 40–150)
ALT SERPL W P-5'-P-CCNC: 37 U/L (ref 8–55)
ALT SERPL W P-5'-P-CCNC: 39 U/L (ref 8–55)
ANION GAP SERPL CALC-SCNC: 10 MMOL/L (ref 10–20)
ANION GAP SERPL CALC-SCNC: 12 MMOL/L (ref 10–20)
AST SERPL-CCNC: 42 U/L (ref 5–34)
AST SERPL-CCNC: 47 U/L (ref 5–34)
BASOPHILS # BLD AUTO: 0 THOU/UL (ref 0–0.2)
BASOPHILS NFR BLD AUTO: 0.4 % (ref 0–1)
BILIRUB SERPL-MCNC: 0.2 MG/DL (ref 0.2–1.2)
BILIRUB SERPL-MCNC: 0.2 MG/DL (ref 0.2–1.2)
BUN SERPL-MCNC: 30 MG/DL (ref 7–18.7)
BUN SERPL-MCNC: 33 MG/DL (ref 7–18.7)
CALCIUM SERPL-MCNC: 7.4 MG/DL (ref 7.8–10.44)
CALCIUM SERPL-MCNC: 7.6 MG/DL (ref 7.8–10.44)
CHLORIDE SERPL-SCNC: 107 MMOL/L (ref 98–107)
CHLORIDE SERPL-SCNC: 108 MMOL/L (ref 98–107)
CO2 SERPL-SCNC: 18 MMOL/L (ref 22–29)
CO2 SERPL-SCNC: 20 MMOL/L (ref 22–29)
CREAT CL PREDICTED SERPL C-G-VRATE: 88 ML/MIN (ref 70–130)
CREAT CL PREDICTED SERPL C-G-VRATE: 91 ML/MIN (ref 70–130)
EOSINOPHIL # BLD AUTO: 0.1 THOU/UL (ref 0–0.7)
EOSINOPHIL NFR BLD AUTO: 0.7 % (ref 0–10)
GLOBULIN SER CALC-MCNC: 2.7 G/DL (ref 2.4–3.5)
GLOBULIN SER CALC-MCNC: 2.7 G/DL (ref 2.4–3.5)
GLUCOSE SERPL-MCNC: 101 MG/DL (ref 70–105)
GLUCOSE SERPL-MCNC: 77 MG/DL (ref 70–105)
HGB BLD-MCNC: 10.2 G/DL (ref 12–16)
HGB BLD-MCNC: 10.6 G/DL (ref 12–16)
LYMPHOCYTES # BLD: 3.7 THOU/UL (ref 1.2–3.4)
LYMPHOCYTES NFR BLD AUTO: 32.3 % (ref 21–51)
MCH RBC QN AUTO: 31.2 PG (ref 27–31)
MCH RBC QN AUTO: 31.4 PG (ref 27–31)
MCV RBC AUTO: 87.9 FL (ref 78–98)
MCV RBC AUTO: 88.2 FL (ref 78–98)
MDIFF COMPLETE?: YES
MONOCYTES # BLD AUTO: 1.5 THOU/UL (ref 0.11–0.59)
MONOCYTES NFR BLD AUTO: 12.9 % (ref 0–10)
NEUTROPHILS # BLD AUTO: 6.1 THOU/UL (ref 1.4–6.5)
NEUTROPHILS NFR BLD AUTO: 53.7 % (ref 42–75)
PLATELET # BLD AUTO: 106 THOU/UL (ref 130–400)
PLATELET # BLD AUTO: 126 THOU/UL (ref 130–400)
POLYCHROMASIA BLD QL SMEAR: (no result) (100X)
POTASSIUM SERPL-SCNC: 4.2 MMOL/L (ref 3.5–5.1)
POTASSIUM SERPL-SCNC: 4.3 MMOL/L (ref 3.5–5.1)
RBC # BLD AUTO: 3.28 MILL/UL (ref 4.2–5.4)
RBC # BLD AUTO: 3.38 MILL/UL (ref 4.2–5.4)
SODIUM SERPL-SCNC: 133 MMOL/L (ref 136–145)
SODIUM SERPL-SCNC: 134 MMOL/L (ref 136–145)
WBC # BLD AUTO: 11.3 THOU/UL (ref 4.8–10.8)
WBC # BLD AUTO: 9.2 THOU/UL (ref 4.8–10.8)

## 2019-08-13 RX ADMIN — NIFEDIPINE SCH MG: 60 TABLET, EXTENDED RELEASE ORAL at 22:44

## 2019-08-13 RX ADMIN — ONDANSETRON PRN MG: 2 INJECTION INTRAMUSCULAR; INTRAVENOUS at 19:40

## 2019-08-13 RX ADMIN — NIFEDIPINE SCH MG: 60 TABLET, EXTENDED RELEASE ORAL at 08:47

## 2019-08-13 NOTE — PDOC.APC
Antepartum Consult


DEMOND LUJAN is a 34 year old female at [25 5/7] gestational weeks.





I was asked by Dr Jarrett to speak with the patient regarding anticipated 

course for a baby born at 25-26 weeks. I outlined that the timing and mode of 

delivery is a decision that will be made by the OB service.  Once the patient 

is taken for delivery, the  resuscitation team will be present.  The 

initial focus will be on respiratory stabilization and may include CPAP or 

intubation with surfactant administration.  I discussed that the patient will 

need to be admitted to the NICU in an isolette due to temperature instability 

associated with prematurity. We will then obtain umbilical IV access as  

babies are at risk for hypoglycemia and feeding intolerance requiring TPN 

administration.   We discussed that babies born  are at higher risk for 

feeding intolerance including NEC, infection and jaundice.  I discussed that 

breastmilk is the best nutrition for  babies and she is strongly 

encouraged to pump after delivery.  Mother does plan to breastfeed and we 

discussed the availability of donor milk. Mom does not want donor milk right 

now as they follow a specific diet which cannot be guaranteed with donor milk.  

I have asked Dr. Jarrett if she is a candidate for  expression to have 

her milk for initial milk feedings. We discussed slowly increasing enteral 

feedings and the use of TPN while increasing feeding volumes. I outlined the 

need for a feeding tube (OG or NG) until suck/swallow/breathe reflex can be 

established. We discussed that the baby will need ROP or head US screening and 

is at risk for IVH and ROP. We also discussed the risk for PDA, low blood 

pressure and the need for ionotropes.  I explained that the duration of 

hospital stay will be determined on the clinical course of the baby.  Mother 

asked about transfer to [facility] prior to delivery. We discussed that this hospital

s NICU is capable of taking care of  babies at 26 weeks and the expected 

fetal weight of the baby but that we do not have pediatric subspecialist 

support available so should her baby need consultation, he would need to be 

transferred.  I advised that if she was considering  transport, she 

should discuss with Dr. Jarrett before her health condition necessitated 

delivery. She had the opportunity to ask questions and they were answered to 

her satisfaction. I encouraged her to contact our service if further questions 

arise.











Labs: 


 Ante Partum Labs











Blood Type  A POSITIVE   19  17:22

## 2019-08-13 NOTE — PDOC.EVN
Event Note





- Event Note


Event Note: 





@1915:





RAJANI annelise





LDR 6





25 weeks 5 days





Severe PIH





S/P Steroids





NICU aware





Evaluated Patient at bedside at Dr pat Request.


S/P 2 rounds prn antihypertensive at 1720ish and 1740s.


Last received oral labetolol 400mg about 10 minutes ago.





mag in use now (load)





FHTs reviewed





I reviewed plan of care with her, and her family member. Di (RN) with me in 

room.





reported to Dr pat.





Continue plan of care.

## 2019-08-13 NOTE — PDOC.LDPN
Labor & Delivery Progress Note





- Subjective


Subjective: other (no sx PIH.  )





- Objective


Abnormal vital signs: mild range bps


General: NAD


Uterine fundus: non tender


FHT: category 1 (for GA)


[place] contractions every: none





- Assessment


(1) Pre-eclampsia superimposed on chronic hypertension, antepartum


Code(s): O11.9 - PRE-EXISTING HYPERTENSION WITH PRE-ECLAMPSIA, UNSP TRIMESTER; 

O10.019 - PRE-EXISTING ESSENTIAL HTN COMP PREGNANCY, UNSP TRIMESTER   Current 

Visit: Yes   Status: Acute   





(2) 25 weeks gestation of pregnancy


Code(s): Z3A.25 - 25 WEEKS GESTATION OF PREGNANCY   Current Visit: Yes   Status

: Acute   


-: 





Pt had spike in BP after restarting IV in severe range treated with labetalol 

20mg IV x 2 and hydralazine 10mg IV x 1.  Pt was given po labetalol 400mg (incr 

as of today) dose early and started on mag and labs redrawn.  Labs were stable.

  BP remained mild, no sx PIH.  Cont procardia xl 60 bid and labetalol 200 q am 

and 400 q pm.  Pt requests transfer to [facility].  Transfer accepted by Dr. Quintana.  

Cont Mag on transfer.

## 2019-08-13 NOTE — ULT
ULTRASOUND OBSTETRICAL LIMITED:

 

DATE: 8/9/2019.

 

HISTORY:

A 34-year-old female with preeclampsia at 25 weeks.

 

FINDINGS:

 

Fetal number: Parra.

Fetal lie: Cephalic.

Maternal cervix: Closed and 3 cm in length.

Placenta: Posterior.  No placenta previa.

Amniotic fluid volume: CARA 10.5 cm.

Fetal heart rate: 145 b.p.m.

 

Fetal anatomy not evaluated.

 

Fetal biometry:

 

Head circumference (HC):       22.0 cm            24 w      0 d

Biparietal diameter (BPD):       6.0 cm            24 w      4 d

Abdominal circumference (AC):      20.0 cm            24 w      3 d

Femur length (FL):             4.3 cm            24 w      0 d

 

Average ultrasound age (AUA):                  24 w      2 d

Estimated date of delivery (YANI): 11/27/2019.

 

Last menstrual period (LMP): 02/14/2019.

Gestational age by LMP: 25 w 1 d.

 

Estimated fetal weight (EFW): 673 g +/- 100 g (1 lb, 8 oz +/- 4 oz).

 

IMPRESSION:

1. Live 2nd trimester intrauterine gestation.

2. Estimated gestational age of 24 weeks, 2 days.

3. Cephalic lie.

 

LORNA COREY

 

POS: TPC

## 2019-08-13 NOTE — PDOC.LDPN
Labor & Delivery Progress Note





- Subjective


Subjective: comfortable, other (No symptoms of PIH including HA vision change 

RUQ pain.  No ctx VB LOF.  Good FM)





- Objective


Abnormal vital signs: BP mild-severe overnight.


General: NAD, resting


Uterine fundus: non tender


FHT: category 1 (for gestational age)


[place] contractions every: none





- Assessment


(1) Pre-eclampsia superimposed on chronic hypertension, antepartum


Code(s): O11.9 - PRE-EXISTING HYPERTENSION WITH PRE-ECLAMPSIA, UNSP TRIMESTER; 

O10.019 - PRE-EXISTING ESSENTIAL HTN COMP PREGNANCY, UNSP TRIMESTER   Current 

Visit: Yes   Status: Acute   





(2) 25 weeks gestation of pregnancy


Code(s): Z3A.25 - 25 WEEKS GESTATION OF PREGNANCY   Current Visit: Yes   Status

: Acute   


-: 


BP elevated last night, pt states she was stressed out about some things.  Incr 

nighttime labetalol to 400mg, cont procardia XL 60bid and labetalol 200 in am.  

Repeat labs today show kidney function stabilized, plt slightly lower at 106 

and AST has increased to 47.  Asymptomatic currently.  s/p Mag.  


Hypothyroid- TSH elevated, incr levothyroxine to 112mcg


Prematurity- Kailash consult ordered as well as repeat EFW/BPP for counseling 

purposes.  S/p BMZ and mag


Cont inpatient mgmt of Preeclampsia with severe features.  If Plt < 100K, LFT 

twice upper limit of nl, Creat = 1, symptoms, or BP unable to be controlled 

with medications will be for delivery.


Cont q shift monitoring.

## 2019-08-13 NOTE — ULT
ULTRASOUND FETAL BIOPHYSICAL PROFILE:

 

DATE: 2019.

 

HISTORY:

A 34-year-old female with preeclampsia at 25 weeks.

 

FINDINGS:

 

Fetal breathin

Fetal tone: 2

Fetal movement: 2

Amniotic fluid volume: 2

 

IMPRESSION: 

Normal biophysical profile score of 8/8, excluding the non-stress test.

 

 

jn []

 

POS: TPC

## 2019-08-13 NOTE — PDOC.EVN
Event Note





- Event Note


Event Note: 





RAJANI OnCall:





2216





Asked to sign transfer forms on behalf of Dr jarrett.





Dr Jarrett has arranged transfer of this patient due to escalating level of 

care under 28 weeks.





See Dr Jarrett prior note